# Patient Record
Sex: MALE | Race: WHITE | NOT HISPANIC OR LATINO | Employment: FULL TIME | URBAN - METROPOLITAN AREA
[De-identification: names, ages, dates, MRNs, and addresses within clinical notes are randomized per-mention and may not be internally consistent; named-entity substitution may affect disease eponyms.]

---

## 2019-11-22 ENCOUNTER — OFFICE VISIT (OUTPATIENT)
Dept: CARDIOLOGY | Age: 39
End: 2019-11-22

## 2019-11-22 VITALS
HEIGHT: 67 IN | BODY MASS INDEX: 25.27 KG/M2 | WEIGHT: 161 LBS | SYSTOLIC BLOOD PRESSURE: 120 MMHG | DIASTOLIC BLOOD PRESSURE: 58 MMHG

## 2019-11-22 DIAGNOSIS — R00.2 PALPITATIONS: Primary | ICD-10-CM

## 2019-11-22 PROCEDURE — 99215 OFFICE O/P EST HI 40 MIN: CPT | Performed by: INTERNAL MEDICINE

## 2019-11-22 PROCEDURE — 93000 ELECTROCARDIOGRAM COMPLETE: CPT | Performed by: INTERNAL MEDICINE

## 2020-05-30 ENCOUNTER — HOSPITAL ENCOUNTER (EMERGENCY)
Age: 40
Discharge: HOME | End: 2020-05-30
Payer: COMMERCIAL

## 2020-05-30 ENCOUNTER — APPOINTMENT (OUTPATIENT)
Dept: GENERAL RADIOLOGY | Age: 40
End: 2020-05-30
Payer: COMMERCIAL

## 2020-05-30 VITALS
WEIGHT: 155 LBS | RESPIRATION RATE: 16 BRPM | OXYGEN SATURATION: 98 % | SYSTOLIC BLOOD PRESSURE: 113 MMHG | HEART RATE: 60 BPM | TEMPERATURE: 99 F | DIASTOLIC BLOOD PRESSURE: 78 MMHG

## 2020-05-30 DIAGNOSIS — S61.032A PUNCTURE WOUND OF LEFT THUMB, INITIAL ENCOUNTER: Primary | ICD-10-CM

## 2020-05-30 PROCEDURE — 73140 X-RAY EXAM OF FINGER(S): CPT

## 2020-05-30 PROCEDURE — 99283 EMERGENCY DEPT VISIT LOW MDM: CPT

## 2020-05-30 RX ORDER — CEPHALEXIN 500 MG/1
500 CAPSULE ORAL 3 TIMES DAILY
Qty: 30 CAPSULE | Refills: 0 | Status: SHIPPED | OUTPATIENT
Start: 2020-05-30 | End: 2020-06-09

## 2020-05-30 NOTE — ED PROVIDER NOTES
Patient Seen in: [facility] In [place]      History   Patient presents with:  Laceration: screw went into lt thumb    Stated Complaint:     HPI    Patient is a pleasant 37-year-old male, right-hand-dominant, presenting for evaluation General: No focal deficit present. Mental Status: He is alert and oriented to person, place, and time.    Psychiatric:         Mood and Affect: Mood normal.         Behavior: Behavior normal.                  ED Course   Labs Reviewed - No data to d Puncture wound of left thumb, initial encounter  (primary encounter diagnosis)    Disposition:  Discharge  5/30/2020  1:21 pm    Follow-up:  THE [facility] in [facility] [address]  [phone]  In 2 days

## 2020-05-30 NOTE — ED INITIAL ASSESSMENT (HPI)
STATES HE SCREWED A SCREW INTO HIS LEFT THUMB TODAY ALMOST COMPLETELY THROUGH THUMB. WAS ABLE TO PULL OUT SCREW

## 2020-11-14 ENCOUNTER — OFFICE VISIT (OUTPATIENT)
Dept: FAMILY MEDICINE CLINIC | Facility: CLINIC | Age: 40
End: 2020-11-14
Payer: COMMERCIAL

## 2020-11-14 VITALS
HEART RATE: 71 BPM | BODY MASS INDEX: 24.64 KG/M2 | SYSTOLIC BLOOD PRESSURE: 131 MMHG | WEIGHT: 157 LBS | TEMPERATURE: 98 F | OXYGEN SATURATION: 98 % | RESPIRATION RATE: 18 BRPM | DIASTOLIC BLOOD PRESSURE: 71 MMHG | HEIGHT: 67 IN

## 2020-11-14 DIAGNOSIS — Z20.822 EXPOSURE TO COVID-19 VIRUS: Primary | ICD-10-CM

## 2020-11-14 PROCEDURE — 99072 ADDL SUPL MATRL&STAF TM PHE: CPT | Performed by: NURSE PRACTITIONER

## 2020-11-14 PROCEDURE — 3075F SYST BP GE 130 - 139MM HG: CPT | Performed by: NURSE PRACTITIONER

## 2020-11-14 PROCEDURE — 3078F DIAST BP <80 MM HG: CPT | Performed by: NURSE PRACTITIONER

## 2020-11-14 PROCEDURE — 3008F BODY MASS INDEX DOCD: CPT | Performed by: NURSE PRACTITIONER

## 2020-11-14 PROCEDURE — 99202 OFFICE O/P NEW SF 15 MIN: CPT | Performed by: NURSE PRACTITIONER

## 2020-11-14 NOTE — PROGRESS NOTES
CHIEF COMPLAINT:   Patient presents with:  Covid: poitive exposure to covid no symptoms       HPI:   Kevin Romero is a 47year old male who presents for covid test d/t recent exposure on Monday. Pt denies any symptoms.     No current outpatient medications on s/s of worsening sx and when to seek higher level of care    Meds & Refills for this Visit:  Requested Prescriptions      No prescriptions requested or ordered in this encounter     Risks, benefits, and side effects of medication explained and discussed

## 2021-02-02 ENCOUNTER — IMMUNIZATION (OUTPATIENT)
Dept: LAB | Age: 41
End: 2021-02-02

## 2021-02-02 DIAGNOSIS — Z23 NEED FOR VACCINATION: Primary | ICD-10-CM

## 2021-02-02 PROCEDURE — 91300 COVID 19 PFIZER-BIONTECH: CPT | Performed by: NURSE PRACTITIONER

## 2021-02-02 PROCEDURE — 0001A COVID 19 PFIZER-BIONTECH: CPT | Performed by: NURSE PRACTITIONER

## 2021-02-23 ENCOUNTER — IMMUNIZATION (OUTPATIENT)
Dept: LAB | Age: 41
End: 2021-02-23

## 2021-02-23 DIAGNOSIS — Z23 NEED FOR VACCINATION: Primary | ICD-10-CM

## 2021-02-23 PROCEDURE — 0002A COVID 19 PFIZER-BIONTECH: CPT

## 2021-02-23 PROCEDURE — 91300 COVID 19 PFIZER-BIONTECH: CPT

## 2024-08-09 ENCOUNTER — HOSPITAL ENCOUNTER (OUTPATIENT)
Dept: ULTRASOUND IMAGING | Facility: HOSPITAL | Age: 44
Discharge: HOME | End: 2024-08-09
Attending: FAMILY MEDICINE
Payer: COMMERCIAL

## 2024-08-09 ENCOUNTER — TELEPHONE (OUTPATIENT)
Dept: FAMILY MEDICINE CLINIC | Facility: CLINIC | Age: 44
End: 2024-08-09

## 2024-08-09 ENCOUNTER — OFFICE VISIT (OUTPATIENT)
Dept: FAMILY MEDICINE CLINIC | Facility: CLINIC | Age: 44
End: 2024-08-09
Payer: COMMERCIAL

## 2024-08-09 VITALS
SYSTOLIC BLOOD PRESSURE: 112 MMHG | HEIGHT: 67 IN | WEIGHT: 154 LBS | BODY MASS INDEX: 24.17 KG/M2 | OXYGEN SATURATION: 98 % | HEART RATE: 85 BPM | DIASTOLIC BLOOD PRESSURE: 68 MMHG | RESPIRATION RATE: 16 BRPM

## 2024-08-09 DIAGNOSIS — N45.1 EPIDIDYMITIS: ICD-10-CM

## 2024-08-09 DIAGNOSIS — N50.811 PAIN IN RIGHT TESTICLE: Primary | ICD-10-CM

## 2024-08-09 DIAGNOSIS — N50.811 PAIN IN RIGHT TESTICLE: ICD-10-CM

## 2024-08-09 LAB
APPEARANCE: CLEAR
BILIRUBIN: NEGATIVE
GLUCOSE (URINE DIPSTICK): NEGATIVE MG/DL
KETONES (URINE DIPSTICK): NEGATIVE MG/DL
LEUKOCYTES: NEGATIVE
MULTISTIX LOT#: NORMAL NUMERIC
NITRITE, URINE: NEGATIVE
OCCULT BLOOD: NEGATIVE
PH, URINE: 7 (ref 4.5–8)
PROTEIN (URINE DIPSTICK): NEGATIVE MG/DL
SPECIFIC GRAVITY: 1.01 (ref 1–1.03)
URINE-COLOR: YELLOW
UROBILINOGEN,SEMI-QN: 0.2 MG/DL (ref 0–1.9)

## 2024-08-09 PROCEDURE — 3078F DIAST BP <80 MM HG: CPT | Performed by: FAMILY MEDICINE

## 2024-08-09 PROCEDURE — 87491 CHLMYD TRACH DNA AMP PROBE: CPT | Performed by: FAMILY MEDICINE

## 2024-08-09 PROCEDURE — 76870 US EXAM SCROTUM: CPT | Performed by: FAMILY MEDICINE

## 2024-08-09 PROCEDURE — 93975 VASCULAR STUDY: CPT | Performed by: FAMILY MEDICINE

## 2024-08-09 PROCEDURE — 87591 N.GONORRHOEAE DNA AMP PROB: CPT | Performed by: FAMILY MEDICINE

## 2024-08-09 PROCEDURE — 99214 OFFICE O/P EST MOD 30 MIN: CPT | Performed by: FAMILY MEDICINE

## 2024-08-09 PROCEDURE — 81003 URINALYSIS AUTO W/O SCOPE: CPT | Performed by: FAMILY MEDICINE

## 2024-08-09 PROCEDURE — 3008F BODY MASS INDEX DOCD: CPT | Performed by: FAMILY MEDICINE

## 2024-08-09 PROCEDURE — 3074F SYST BP LT 130 MM HG: CPT | Performed by: FAMILY MEDICINE

## 2024-08-09 RX ORDER — CIPROFLOXACIN 500 MG/1
500 TABLET, FILM COATED ORAL 2 TIMES DAILY
Qty: 20 TABLET | Refills: 0 | Status: SHIPPED | OUTPATIENT
Start: 2024-08-09

## 2024-08-09 RX ORDER — CIPROFLOXACIN 500 MG/1
500 TABLET, FILM COATED ORAL 2 TIMES DAILY
Qty: 14 TABLET | Refills: 0 | Status: SHIPPED | OUTPATIENT
Start: 2024-08-09 | End: 2024-08-09

## 2024-08-09 NOTE — TELEPHONE ENCOUNTER
Pt called back    Reports intermittent ache in rt testicle since Monday  Feels it is tender to the touch    He thinks felt a small possible lump on top of right testicle.    Denies injury    No swelling   No fevers    Felt weds it radiated to lower abd/hip area    Hx of vasectomy 2 yrs ago    Reports he is leaving for [place] tomorrow.    Advised to keep appt as scheduled.    Sending as FYI

## 2024-08-09 NOTE — TELEPHONE ENCOUNTER
Pt calling after he s/w you regarding results, I don't see any notes yet.    Has multiple questions.    Asking if you can call him again if you are able?    He wonders why he needs to take an abx if no infection?    Wonders if you suspect infection why no labs were ordered?    Asks why there is inflammation and what caused?    Asking if he absolutely needs to take the abx as he wants to avoid taking if possible, has stomach issues and is traveling tomorrow.    He is aware you are gone for the day but that I will forward to you.    Please advise vince

## 2024-08-09 NOTE — TELEPHONE ENCOUNTER
LM for pt to cb.    At request of Dr Ordonez, please triage his [id number] appt today for \"testicular pain\"    thx

## 2024-08-09 NOTE — PROGRESS NOTES
HISTORY:  Chief Complaint   Patient presents with    Pain     Pt reports right testicle pain since Monday. No known trauma.      Monday night slight pain in right testicular area   Played soccer Tuesday night   Wednesday had pain   Today denies any pain just slight tender bottom of right testes  mainly   No high riding tested no redness no warmth   No back pain   No flank ]pain   No nv   No fever chills  Has wife 2 kids   No other sexual partners   No urinary issues /burning no abd pain  no bowel or bladder issues     The following portions of the patient's history were reviewed and updated as appropriate:  History reviewed. No pertinent past medical history.  There is no problem list on file for this patient.    Past Surgical History:   Procedure Laterality Date    Vasectomy       History reviewed. No pertinent family history.  Social History     Socioeconomic History    Marital status:    Tobacco Use    Smoking status: Never    Smokeless tobacco: Never   Vaping Use    Vaping status: Never Used   Substance and Sexual Activity    Alcohol use: Yes     Comment: 6/wk    Drug use: Never       Current Outpatient Medications   Medication Sig Dispense Refill    ciprofloxacin 500 MG Oral Tab Take 1 tablet (500 mg total) by mouth 2 (two) times daily. 20 tablet 0       Allergies   Allergen Reactions    Penicillins RASH         Review of Systems  Const: Denies fever chills  Eyes: Denies drainage no pain with movement of eye  ENT: denies sore throat,no ear pain ,no sinus drainage  CV: Denies chest pain or palpitations  Pulm: Denies shortness of breath  GI: Denies abdominal pain, nausea, vomiting or diarrhea  : Testicular pain as described above denies dysuria  Musculoskeletal: Denies neck or back pain  Skin: Denies rashes  Neuro: Denies focal weakness or numbness,      Vitals:    08/09/24 0932   BP: 112/68   Pulse: 85   Resp: 16   SpO2: 98%   Weight: 154 lb (69.9 kg)   Height: 5' 7\" (1.702 m)        Physical  Exam  General Appearance:  Alert, oriented, in no acute distress  Eyes no discharge  Neck ;soft supple,no obvious swelling  CNS: no acute focal neurological deficits  Chest Wall:  no chest wall tenderness.  Lungs:  Normal expansion.  Clear to auscultation.   Heart:  Heart sounds are normal.    Abdomen:  Soft, non-tender, normal bowel sounds; no rebound no guarding  Psych mood and affect appropriate  skin ;no obvious skin lesion in exposed area  Lower Extremities: No gross edema,  Testes there is no warmth no hide riding testes patient has a point tenderness at the bottom of the right testes possible epididymitis possible cyst other differential diagnoses like torsion and others discussed we will get a stat ultrasound  No erythema of the testes no penile discharge    Assessment/Plan:    Jim was seen today for pain.    Diagnoses and all orders for this visit:    Pain in right testicle possible epididymitis other differential diagnosis including torsion discussed patient will get a stat ultrasound we will start him on Cipro advised to do icing rest  He understands importance of that  Discussed with him too much activity can flare it up  Advised to see urolo any worsening symptoms needs to go to the ER stat he very well understands keep good hydration take medication with food side effects cipro  discussed can causegy  Tendon aches tendinitis tendon rupture  He understands   -     US SCROTUM W/ DOPPLER (CPT=93975/09597); Future  -     Chlamydia/Gc Amplification [E]; Future  -     UROLOGY - INTERNAL  -     Urine Dip, auto without Micro  -     Chlamydia/Gc Amplification [E]    Epididymitis  -     US SCROTUM W/ DOPPLER (CPT=93975/09817); Future  -     Chlamydia/Gc Amplification [E]; Future  -     UROLOGY - INTERNAL  -     Chlamydia/Gc Amplification [E]    Other orders  -     Discontinue: ciprofloxacin 500 MG Oral Tab; Take 1 tablet (500 mg total) by mouth 2 (two) times daily.  -     ciprofloxacin 500 MG Oral Tab; Take 1  tablet (500 mg total) by mouth 2 (two) times daily.      There is no problem list on file for this patient.      Patient's Body mass index is 24.12 kg/m².    .    Return in about 1 week (around 8/16/2024).      ciprofloxacin 500 MG Oral Tab, Take 1 tablet (500 mg total) by mouth 2 (two) times daily., Disp: 20 tablet, Rfl: 0    [facility] Ordonez MD  8/9/2024

## 2024-08-12 DIAGNOSIS — N50.82 SCROTAL PAIN: Primary | ICD-10-CM

## 2024-08-12 LAB
C TRACH DNA SPEC QL NAA+PROBE: NEGATIVE
N GONORRHOEA DNA SPEC QL NAA+PROBE: NEGATIVE

## 2024-08-12 NOTE — TELEPHONE ENCOUNTER
I talked to him in detail for 9 + minutes  he will take abx with food   He will see urologist   One more referral given   Any worsening to er   Rest ice

## 2024-08-20 ENCOUNTER — OFFICE VISIT (OUTPATIENT)
Dept: FAMILY MEDICINE CLINIC | Facility: CLINIC | Age: 44
End: 2024-08-20
Payer: COMMERCIAL

## 2024-08-20 ENCOUNTER — TELEPHONE (OUTPATIENT)
Dept: FAMILY MEDICINE CLINIC | Facility: CLINIC | Age: 44
End: 2024-08-20

## 2024-08-20 VITALS
WEIGHT: 155 LBS | OXYGEN SATURATION: 95 % | SYSTOLIC BLOOD PRESSURE: 116 MMHG | DIASTOLIC BLOOD PRESSURE: 70 MMHG | BODY MASS INDEX: 24.33 KG/M2 | HEIGHT: 67 IN | HEART RATE: 67 BPM | TEMPERATURE: 99 F

## 2024-08-20 DIAGNOSIS — Z78.9 ALCOHOL USE: ICD-10-CM

## 2024-08-20 DIAGNOSIS — Z00.00 ANNUAL PHYSICAL EXAM: Primary | ICD-10-CM

## 2024-08-20 DIAGNOSIS — R10.2 PELVIC PAIN: ICD-10-CM

## 2024-08-20 DIAGNOSIS — N43.3 HYDROCELE OF TESTIS: ICD-10-CM

## 2024-08-20 DIAGNOSIS — R10.30 INGUINAL PAIN, UNSPECIFIED LATERALITY: ICD-10-CM

## 2024-08-20 PROCEDURE — 99214 OFFICE O/P EST MOD 30 MIN: CPT | Performed by: FAMILY MEDICINE

## 2024-08-20 PROCEDURE — 3078F DIAST BP <80 MM HG: CPT | Performed by: FAMILY MEDICINE

## 2024-08-20 PROCEDURE — 3008F BODY MASS INDEX DOCD: CPT | Performed by: FAMILY MEDICINE

## 2024-08-20 PROCEDURE — 3074F SYST BP LT 130 MM HG: CPT | Performed by: FAMILY MEDICINE

## 2024-08-20 NOTE — TELEPHONE ENCOUNTER
Tc to pt to clarify this--is he asking for the order to be faxed? Has not completed a CT scan to fax    Sts to disregard, he was wanting an order faxed to [facility] but now having through us 8/22. He wanted sooner than his appt that he has with Dr Munoz next [facility] we can disregard this request

## 2024-08-20 NOTE — PROGRESS NOTES
HISTORY:  Chief Complaint   Patient presents with    Physical     Would like blood work  No concerns  Due for tdap   For physical   General;  Pt denies headache or dizziness no visual issues  No cp or sob no hernández no palpitations  No abdomen  pain no nausea or vomiting  No leg pain no abdominal distention no numbness or any tingling or any focal weakness  Eating ok  No alt in bowel movement  No blood is stools or urine  No urinary issues  No leg pain or swelling  No abdomen distension  No fever chills  No cough congestion  No depression no suicidal ideation or  homicidal ideation no hallucinations   Alcohol use as below  Advised to quit it   Still some intermittent testicular pain and groin pain   Says motrin resolves the pain he just started taking cipro   He has an apt with urology in 1 weeks time   No fever chills no nv   No urinary burning or any discharge   He does play a lot of sports may be soccer twice a week and lot of walking     Denies any family ho of testicular ca   Feels a small cyst right testes   The following portions of the patient's history were reviewed and updated as appropriate:  Past Medical History:    Allergic rhinitis    Anxiety     There is no problem list on file for this patient.    Past Surgical History:   Procedure Laterality Date    Vasectomy       Family History   Problem Relation Age of Onset    Cancer Maternal Grandmother      Social History     Socioeconomic History    Marital status:    Tobacco Use    Smoking status: Never    Smokeless tobacco: Never   Vaping Use    Vaping status: Never Used   Substance and Sexual Activity    Alcohol use: Yes     Alcohol/week: 9.0 standard drinks of alcohol     Types: 4 Cans of beer, 5 Shots of liquor per week     Comment: 6/wk    Drug use: Never   Other Topics Concern    Caffeine Concern No    Exercise No    Seat Belt No    Special Diet No    Stress Concern Yes     Comment: General life    Weight Concern No       Current Outpatient  Medications   Medication Sig Dispense Refill    ciprofloxacin 500 MG Oral Tab Take 1 tablet (500 mg total) by mouth 2 (two) times daily. 20 tablet 0       Allergies   Allergen Reactions    Dust OTHER (SEE COMMENTS)    Pollen OTHER (SEE COMMENTS)    Penicillins RASH         Review of Systems  Const: Denies fever chills  Eyes: Denies drainage no pain with movement of eye  ENT: denies sore throat,no ear pain ,no sinus drainage  CV: Denies chest pain or palpitations  Pulm: Denies shortness of breath  GI: Denies abdominal pain, nausea, vomiting or diarrhea  : Denies dysuria testicular groin issues as above   Musculoskeletal: Denies neck or back pain  Skin: Denies rashes  Neuro: Denies focal weakness or numbness,      Vitals:    08/20/24 1231   BP: 116/70   Pulse: 67   Temp: 98.5 °F (36.9 °C)   TempSrc: Temporal   SpO2: 95%   Weight: 155 lb (70.3 kg)   Height: 5' 7\" (1.702 m)        Physical Exam  General Appearance:  Alert, oriented, in no acute distress  Eyes no discharge  Neck ;soft supple,no obvious swelling  CNS: no acute focal neurological deficits  Chest Wall:  no chest wall tenderness.  Lungs:  Normal expansion.  Clear to auscultation.   Heart:  Heart sounds are normal.    Abdomen:  Soft, non-tender, normal bowel sounds;  Psych mood and affect appropriate  skin ;no obvious skin lesion in exposed area  Lower Extremities: No gross edema,  Right testes on the medial aspect likely a small cyst   US dw him   Will get ct scan   He will get urology opinion         Assessment/Plan:    Jim was seen today for physical.    Diagnoses and all orders for this visit:    Annual physical exam  -     Urinalysis, Routine; Future  -     Lipid Panel; Future  -     Assay, Thyroid Stim Hormone; Future  -     Comp Metabolic Panel (14); Future  -     CBC With Differential With Platelet; Future  D/d dw him in detail  drink more fluids   BMI 24.0-24.9, adult  Diet exercise   Alcohol use  Aviced to quit risks dw him in detail   He  understands    Pelvic pain  -     Cancel: CT PELVIS(CONTRAST ONLY) (CPT=72193); Future  -     PSA Total, Diagnostic; Future  -     CT ABDOMEN+PELVIS(CONTRAST ONLY)(CPT=74177); Future  Fluids ct scan urology opinion   Inguinal pain, unspecified laterality  -     CT ABDOMEN+PELVIS(CONTRAST ONLY)(CPT=74177); Future    Testicular hydrocele bl   Dw him in detail ?cyst   He will get ct scan and will get urology opinion   Rest iceing dw   Take abx  /motrin   D/d dw him in detail he understands    See us in 2-3 weeks   Imp of fu dw him   As above   There is no problem list on file for this patient.      Patient's Body mass index is 24.28 kg/m².    .    Return in about 3 weeks (around 9/10/2024).      ciprofloxacin 500 MG Oral Tab, Take 1 tablet (500 mg total) by mouth 2 (two) times daily., Disp: 20 tablet, Rfl: 0    [facility] Ordonez MD  8/20/2024

## 2024-08-22 ENCOUNTER — HOSPITAL ENCOUNTER (OUTPATIENT)
Dept: CT IMAGING | Facility: HOSPITAL | Age: 44
Discharge: HOME | End: 2024-08-22
Attending: FAMILY MEDICINE
Payer: COMMERCIAL

## 2024-08-22 DIAGNOSIS — R10.30 INGUINAL PAIN, UNSPECIFIED LATERALITY: ICD-10-CM

## 2024-08-22 DIAGNOSIS — R10.2 PELVIC PAIN: ICD-10-CM

## 2024-08-22 LAB
CREAT BLD-MCNC: 0.8 MG/DL
EGFRCR SERPLBLD CKD-EPI 2021: 113 ML/MIN/1.73M2 (ref 60–?)

## 2024-08-22 PROCEDURE — 74177 CT ABD & PELVIS W/CONTRAST: CPT | Performed by: FAMILY MEDICINE

## 2024-08-22 PROCEDURE — 82565 ASSAY OF CREATININE: CPT

## 2024-08-23 ENCOUNTER — APPOINTMENT (OUTPATIENT)
Dept: ULTRASOUND IMAGING | Facility: HOSPITAL | Age: 44
End: 2024-08-23
Attending: EMERGENCY MEDICINE
Payer: COMMERCIAL

## 2024-08-23 ENCOUNTER — HOSPITAL ENCOUNTER (EMERGENCY)
Facility: HOSPITAL | Age: 44
Discharge: HOME | End: 2024-08-23
Attending: EMERGENCY MEDICINE
Payer: COMMERCIAL

## 2024-08-23 VITALS
WEIGHT: 156.06 LBS | OXYGEN SATURATION: 100 % | SYSTOLIC BLOOD PRESSURE: 129 MMHG | TEMPERATURE: 97 F | RESPIRATION RATE: 16 BRPM | DIASTOLIC BLOOD PRESSURE: 79 MMHG | HEART RATE: 62 BPM | BODY MASS INDEX: 24 KG/M2

## 2024-08-23 DIAGNOSIS — N50.89: Primary | ICD-10-CM

## 2024-08-23 LAB
ALBUMIN SERPL-MCNC: 4.9 G/DL (ref 3.2–4.8)
ALBUMIN/GLOB SERPL: 2 {RATIO} (ref 1–2)
ALP LIVER SERPL-CCNC: 87 U/L
ALT SERPL-CCNC: 31 U/L
ANION GAP SERPL CALC-SCNC: <0 MMOL/L (ref 0–18)
AST SERPL-CCNC: 35 U/L (ref ?–34)
BASOPHILS # BLD AUTO: 0.02 X10(3) UL (ref 0–0.2)
BASOPHILS NFR BLD AUTO: 0.5 %
BILIRUB SERPL-MCNC: 0.8 MG/DL (ref 0.3–1.2)
BILIRUB UR QL STRIP.AUTO: NEGATIVE
BUN BLD-MCNC: 7 MG/DL (ref 9–23)
CALCIUM BLD-MCNC: 10.3 MG/DL (ref 8.7–10.4)
CHLORIDE SERPL-SCNC: 108 MMOL/L (ref 98–112)
CLARITY UR REFRACT.AUTO: CLEAR
CO2 SERPL-SCNC: 33 MMOL/L (ref 21–32)
COLOR UR AUTO: COLORLESS
CREAT BLD-MCNC: 0.86 MG/DL
EGFRCR SERPLBLD CKD-EPI 2021: 111 ML/MIN/1.73M2 (ref 60–?)
EOSINOPHIL # BLD AUTO: 0.06 X10(3) UL (ref 0–0.7)
EOSINOPHIL NFR BLD AUTO: 1.5 %
ERYTHROCYTE [DISTWIDTH] IN BLOOD BY AUTOMATED COUNT: 11.3 %
GLOBULIN PLAS-MCNC: 2.4 G/DL (ref 2–3.5)
GLUCOSE BLD-MCNC: 99 MG/DL (ref 70–99)
GLUCOSE UR STRIP.AUTO-MCNC: NORMAL MG/DL
HCT VFR BLD AUTO: 40.9 %
HGB BLD-MCNC: 14.2 G/DL
IMM GRANULOCYTES # BLD AUTO: 0.01 X10(3) UL (ref 0–1)
IMM GRANULOCYTES NFR BLD: 0.3 %
KETONES UR STRIP.AUTO-MCNC: NEGATIVE MG/DL
LEUKOCYTE ESTERASE UR QL STRIP.AUTO: NEGATIVE
LIPASE SERPL-CCNC: 34 U/L (ref 12–53)
LYMPHOCYTES # BLD AUTO: 1.35 X10(3) UL (ref 1–4)
LYMPHOCYTES NFR BLD AUTO: 34.2 %
MCH RBC QN AUTO: 29.8 PG (ref 26–34)
MCHC RBC AUTO-ENTMCNC: 34.7 G/DL (ref 31–37)
MCV RBC AUTO: 85.7 FL
MONOCYTES # BLD AUTO: 0.35 X10(3) UL (ref 0.1–1)
MONOCYTES NFR BLD AUTO: 8.9 %
NEUTROPHILS # BLD AUTO: 2.16 X10 (3) UL (ref 1.5–7.7)
NEUTROPHILS # BLD AUTO: 2.16 X10(3) UL (ref 1.5–7.7)
NEUTROPHILS NFR BLD AUTO: 54.6 %
NITRITE UR QL STRIP.AUTO: NEGATIVE
OSMOLALITY SERPL CALC.SUM OF ELEC: 286 MOSM/KG (ref 275–295)
PH UR STRIP.AUTO: 6 [PH] (ref 5–8)
PLATELET # BLD AUTO: 176 10(3)UL (ref 150–450)
POTASSIUM SERPL-SCNC: 4.2 MMOL/L (ref 3.5–5.1)
PROT SERPL-MCNC: 7.3 G/DL (ref 5.7–8.2)
PROT UR STRIP.AUTO-MCNC: NEGATIVE MG/DL
RBC # BLD AUTO: 4.77 X10(6)UL
RBC UR QL AUTO: NEGATIVE
SODIUM SERPL-SCNC: 139 MMOL/L (ref 136–145)
SP GR UR STRIP.AUTO: <1.005 (ref 1–1.03)
UROBILINOGEN UR STRIP.AUTO-MCNC: NORMAL MG/DL
WBC # BLD AUTO: 4 X10(3) UL (ref 4–11)

## 2024-08-23 PROCEDURE — 81003 URINALYSIS AUTO W/O SCOPE: CPT | Performed by: EMERGENCY MEDICINE

## 2024-08-23 PROCEDURE — 99284 EMERGENCY DEPT VISIT MOD MDM: CPT

## 2024-08-23 PROCEDURE — 76870 US EXAM SCROTUM: CPT | Performed by: EMERGENCY MEDICINE

## 2024-08-23 PROCEDURE — 80053 COMPREHEN METABOLIC PANEL: CPT | Performed by: EMERGENCY MEDICINE

## 2024-08-23 PROCEDURE — 83690 ASSAY OF LIPASE: CPT | Performed by: EMERGENCY MEDICINE

## 2024-08-23 PROCEDURE — 85025 COMPLETE CBC W/AUTO DIFF WBC: CPT | Performed by: EMERGENCY MEDICINE

## 2024-08-23 PROCEDURE — 93975 VASCULAR STUDY: CPT | Performed by: EMERGENCY MEDICINE

## 2024-08-23 PROCEDURE — 36415 COLL VENOUS BLD VENIPUNCTURE: CPT

## 2024-08-23 RX ORDER — NAPROXEN 500 MG/1
500 TABLET ORAL 2 TIMES DAILY PRN
Qty: 20 TABLET | Refills: 0 | Status: SHIPPED | OUTPATIENT
Start: 2024-08-23 | End: 2024-09-02

## 2024-08-23 NOTE — DISCHARGE INSTRUCTIONS
Take medication as prescribed.  Follow-up with urologist.  Return for severe pain, fever or other concerns.

## 2024-08-23 NOTE — ED PROVIDER NOTES
Patient Seen in: [facility] Emergency Department      History     Chief Complaint   Patient presents with    Abdomen/Flank Pain    Tang-KATHERINE     Stated Complaint: abd pain, testicular pain. ct was done yesterday, US done earlier this month    Subjective:   HPI    44-year-old male with past medical history as below including vasectomy 2022 presents with right testicle pain that started on 8/5/2024.  He states he felt around the testicle and felt not better.  States he followed up with his doctor ordered an ultrasound which did not show anything significant.  He was prescribed Cipro for possible epididymitis.  He states he was going out of town and did not start taking the Cipro.  He was taking ibuprofen 600 mg with improvement in the pain.  He states he started taking Cipro after he got back home 5 days ago.  He felt the pain had gotten better but he noted some pain that radiated into his right lower abdomen and right hip area.  He had a physical with his doctor 3 days ago who ordered a CT of his abdomen which she had done yesterday and did not show any abnormality.  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  He states he was running and kicking the soccer ball but does not recall any specific injury.  He states he again feels a lump in his testicle but is more painful.  He denies any urinary symptoms.  Denies fever or chills.  Denies nausea or vomiting.  He states he did have some loose stools after taking the antibiotic.    Objective:   Past Medical History:    Allergic rhinitis    Anxiety              Past Surgical History:   Procedure Laterality Date    Vasectomy                  Social History     Socioeconomic History    Marital status:    Tobacco Use    Smoking status: Never    Smokeless tobacco: Never   Vaping Use    Vaping status: Never Used   Substance and Sexual Activity    Alcohol use: Yes     Alcohol/week: 9.0 standard drinks of  alcohol     Types: 4 Cans of beer, 5 Shots of liquor per week     Comment: 6/wk    Drug use: Never   Other Topics Concern    Caffeine Concern No    Exercise No    Seat Belt No    Special Diet No    Stress Concern Yes     Comment: General life    Weight Concern No              Review of Systems    Positive for stated Chief Complaint: Abdomen/Flank Pain and Eval-G    Other systems are as noted in HPI.  Constitutional and vital signs reviewed.      All other systems reviewed and negative except as noted above.    Physical Exam     ED Triage Vitals [08/23/24 1000]   /79   Pulse 62   Resp 16   Temp 97.3 °F (36.3 °C)   Temp src Temporal   SpO2 100 %   O2 Device None (Room air)       Current Vitals:   Vital Signs  BP: 129/79  Pulse: 62  Resp: 16  Temp: 97.3 °F (36.3 °C)  Temp src: Temporal    Oxygen Therapy  SpO2: 100 %  O2 Device: None (Room air)            Physical Exam  Vitals and nursing note reviewed.   Constitutional:       General: He is not in acute distress.     Appearance: He is well-developed. He is not ill-appearing.   HENT:      Head: Normocephalic and atraumatic.      Mouth/Throat:      Mouth: Mucous membranes are moist.   Eyes:      General: No scleral icterus.     Extraocular Movements: Extraocular movements intact.   Abdominal:      General: Bowel sounds are normal. There is no distension.      Palpations: Abdomen is soft.      Tenderness: There is no abdominal tenderness.   Genitourinary:     Comments: Right posterior testicle with some mild nodularity with tenderness  Testicles are symmetric in size  No abnormal lie  No overlying scrotal changes of the skin  Musculoskeletal:      Cervical back: Neck supple.      Comments: Right medial abductor muscle with some tenderness   Skin:     General: Skin is warm and dry.      Capillary Refill: Capillary refill takes less than 2 seconds.   Neurological:      Mental Status: He is alert and oriented to person, place, and time.      GCS: GCS eye subscore is 4.  GCS verbal subscore is 5. GCS motor subscore is 6.   Psychiatric:         Mood and Affect: Mood normal.         Behavior: Behavior normal.               ED Course     Labs Reviewed   URINALYSIS WITH CULTURE REFLEX - Abnormal; Notable for the following components:       Result Value    Urine Color Colorless (*)     Spec Gravity <1.005 (*)     All other components within normal limits   COMP METABOLIC PANEL (14) - Abnormal; Notable for the following components:    CO2 33.0 (*)     Anion Gap <0 (*)     BUN 7 (*)     AST 35 (*)     Albumin 4.9 (*)     All other components within normal limits   LIPASE - Normal   CBC WITH DIFFERENTIAL WITH PLATELET            US SCROTUM W/ DOPPLER (CPT=93975/07439)    Result Date: 8/23/2024  CONCLUSION:  Palpable abnormality corresponds to an echogenic 8 mm nodule in the right epididymal tail.  Imaging features suggest this is most likely a sperm granuloma.  Testicles are otherwise unremarkable.   LOCATION:  [facility]    Dictated by (CST): Ramsey Schroeder MD on 8/23/2024 at 1:17 PM     Finalized by (CST): Ramsey Schroeder MD on 8/23/2024 at 1:20 PM               MDM      44-year-old male with past medical history as below presents with right testicle pain that started on 8/5/2024.    Differential includes but is not limited to epididymitis, epididymal cyst, scar tissue from vasectomy unlikelytorsion    Chart reviewed from PCP visit on 8/5/2024 patient presented with testicular pain.  Patient had ultrasound of the testicles that showed small hydrocele.  Normal flow to both testicles.  He was prescribed Cipro for presumptive epididymitis.  Patient saw PCP on 8/20/2024 for physical.  He reported pain radiating to his lower abdomen and groin area.  Patient had CT abdomen/pelvis yesterday with results as above showing no acute process.    Labs today are unremarkable with WBC 4K without left shift.  UA without evidence of infection.    Independent interpretation of scrotal ultrasound shows no evidence  of torsion.  Radiology report reviewed as above noting a palpable abnormality corresponds to an echogenic 8 mm nodule in the right epididymal tail with imaging features suggest this is most likely a sperm granuloma.  Testicles are otherwise unremarkable.      Patient informed of findings and need for follow-up with urology.  Return precautions discussed.                               Medical Decision Making  Amount and/or Complexity of Data Reviewed  External Data Reviewed: radiology and notes.     Details: See MDM  Labs: ordered. Decision-making details documented in ED Course.  Radiology: ordered and independent interpretation performed. Decision-making details documented in ED Course.    Risk  Prescription drug management.        Disposition and Plan     Clinical Impression:  1. Sperm granuloma of epididymis         Disposition:  Discharge  8/23/2024  1:45 pm    Follow-up:  Prabhakar Munoz MD  [address] [place] [id number] [phone]    Schedule an appointment as soon as possible for a visit            Medications Prescribed:  Current Discharge Medication List        START taking these medications    Details   naproxen 500 MG Oral Tab Take 1 tablet (500 mg total) by mouth 2 (two) times daily as needed.  Qty: 20 tablet, Refills: 0

## 2024-08-23 NOTE — ED INITIAL ASSESSMENT (HPI)
Pt here with R lower abd pain, R testicle, gluteal area since 8/5. Had US and CT scan, started cipro. Pt with diarrhea, denies dysuria.